# Patient Record
Sex: FEMALE | Race: WHITE | URBAN - METROPOLITAN AREA
[De-identification: names, ages, dates, MRNs, and addresses within clinical notes are randomized per-mention and may not be internally consistent; named-entity substitution may affect disease eponyms.]

---
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
## 2025-06-10 ENCOUNTER — EMERGENCY (EMERGENCY)
Facility: HOSPITAL | Age: 20
LOS: 1 days | End: 2025-06-10
Attending: EMERGENCY MEDICINE
Payer: COMMERCIAL

## 2025-06-10 VITALS
TEMPERATURE: 98 F | HEART RATE: 61 BPM | SYSTOLIC BLOOD PRESSURE: 112 MMHG | OXYGEN SATURATION: 99 % | RESPIRATION RATE: 16 BRPM | DIASTOLIC BLOOD PRESSURE: 69 MMHG

## 2025-06-10 VITALS
RESPIRATION RATE: 19 BRPM | DIASTOLIC BLOOD PRESSURE: 71 MMHG | TEMPERATURE: 98 F | HEART RATE: 77 BPM | SYSTOLIC BLOOD PRESSURE: 104 MMHG | WEIGHT: 149.91 LBS | OXYGEN SATURATION: 96 % | HEIGHT: 65 IN

## 2025-06-10 LAB
ALBUMIN SERPL ELPH-MCNC: 4.6 G/DL — SIGNIFICANT CHANGE UP (ref 3.3–5)
ALP SERPL-CCNC: 77 U/L — SIGNIFICANT CHANGE UP (ref 40–120)
ALT FLD-CCNC: 9 U/L — LOW (ref 10–45)
ANION GAP SERPL CALC-SCNC: 13 MMOL/L — SIGNIFICANT CHANGE UP (ref 5–17)
APPEARANCE UR: CLEAR — SIGNIFICANT CHANGE UP
AST SERPL-CCNC: 15 U/L — SIGNIFICANT CHANGE UP (ref 10–40)
BACTERIA # UR AUTO: ABNORMAL /HPF
BASOPHILS # BLD AUTO: 0.08 K/UL — SIGNIFICANT CHANGE UP (ref 0–0.2)
BASOPHILS NFR BLD AUTO: 0.7 % — SIGNIFICANT CHANGE UP (ref 0–2)
BILIRUB SERPL-MCNC: 0.5 MG/DL — SIGNIFICANT CHANGE UP (ref 0.2–1.2)
BILIRUB UR-MCNC: NEGATIVE — SIGNIFICANT CHANGE UP
BUN SERPL-MCNC: 11 MG/DL — SIGNIFICANT CHANGE UP (ref 7–23)
CALCIUM SERPL-MCNC: 9.4 MG/DL — SIGNIFICANT CHANGE UP (ref 8.4–10.5)
CAST: 0 /LPF — SIGNIFICANT CHANGE UP (ref 0–4)
CHLORIDE SERPL-SCNC: 104 MMOL/L — SIGNIFICANT CHANGE UP (ref 96–108)
CO2 SERPL-SCNC: 22 MMOL/L — SIGNIFICANT CHANGE UP (ref 22–31)
COLOR SPEC: SIGNIFICANT CHANGE UP
CREAT SERPL-MCNC: 0.74 MG/DL — SIGNIFICANT CHANGE UP (ref 0.5–1.3)
DIFF PNL FLD: ABNORMAL
EGFR: 119 ML/MIN/1.73M2 — SIGNIFICANT CHANGE UP
EGFR: 119 ML/MIN/1.73M2 — SIGNIFICANT CHANGE UP
EOSINOPHIL # BLD AUTO: 0.1 K/UL — SIGNIFICANT CHANGE UP (ref 0–0.5)
EOSINOPHIL NFR BLD AUTO: 0.9 % — SIGNIFICANT CHANGE UP (ref 0–6)
GLUCOSE SERPL-MCNC: 92 MG/DL — SIGNIFICANT CHANGE UP (ref 70–99)
GLUCOSE UR QL: NEGATIVE MG/DL — SIGNIFICANT CHANGE UP
HCG SERPL-ACNC: <2 MIU/ML — SIGNIFICANT CHANGE UP
HCT VFR BLD CALC: 43.5 % — SIGNIFICANT CHANGE UP (ref 34.5–45)
HGB BLD-MCNC: 14.7 G/DL — SIGNIFICANT CHANGE UP (ref 11.5–15.5)
IMM GRANULOCYTES NFR BLD AUTO: 0.3 % — SIGNIFICANT CHANGE UP (ref 0–0.9)
KETONES UR QL: NEGATIVE MG/DL — SIGNIFICANT CHANGE UP
LEUKOCYTE ESTERASE UR-ACNC: NEGATIVE — SIGNIFICANT CHANGE UP
LYMPHOCYTES # BLD AUTO: 3.58 K/UL — HIGH (ref 1–3.3)
LYMPHOCYTES # BLD AUTO: 30.7 % — SIGNIFICANT CHANGE UP (ref 13–44)
MCHC RBC-ENTMCNC: 28.9 PG — SIGNIFICANT CHANGE UP (ref 27–34)
MCHC RBC-ENTMCNC: 33.8 G/DL — SIGNIFICANT CHANGE UP (ref 32–36)
MCV RBC AUTO: 85.5 FL — SIGNIFICANT CHANGE UP (ref 80–100)
MONOCYTES # BLD AUTO: 1 K/UL — HIGH (ref 0–0.9)
MONOCYTES NFR BLD AUTO: 8.6 % — SIGNIFICANT CHANGE UP (ref 2–14)
NEUTROPHILS # BLD AUTO: 6.86 K/UL — SIGNIFICANT CHANGE UP (ref 1.8–7.4)
NEUTROPHILS NFR BLD AUTO: 58.8 % — SIGNIFICANT CHANGE UP (ref 43–77)
NITRITE UR-MCNC: NEGATIVE — SIGNIFICANT CHANGE UP
NRBC BLD AUTO-RTO: 0 /100 WBCS — SIGNIFICANT CHANGE UP (ref 0–0)
PH UR: 6.5 — SIGNIFICANT CHANGE UP (ref 5–8)
PLATELET # BLD AUTO: 265 K/UL — SIGNIFICANT CHANGE UP (ref 150–400)
POTASSIUM SERPL-MCNC: 4.2 MMOL/L — SIGNIFICANT CHANGE UP (ref 3.5–5.3)
POTASSIUM SERPL-SCNC: 4.2 MMOL/L — SIGNIFICANT CHANGE UP (ref 3.5–5.3)
PROT SERPL-MCNC: 7.8 G/DL — SIGNIFICANT CHANGE UP (ref 6–8.3)
PROT UR-MCNC: NEGATIVE MG/DL — SIGNIFICANT CHANGE UP
RBC # BLD: 5.09 M/UL — SIGNIFICANT CHANGE UP (ref 3.8–5.2)
RBC # FLD: 12.1 % — SIGNIFICANT CHANGE UP (ref 10.3–14.5)
RBC CASTS # UR COMP ASSIST: 4 /HPF — SIGNIFICANT CHANGE UP (ref 0–4)
SODIUM SERPL-SCNC: 139 MMOL/L — SIGNIFICANT CHANGE UP (ref 135–145)
SP GR SPEC: 1.02 — SIGNIFICANT CHANGE UP (ref 1–1.03)
SQUAMOUS # UR AUTO: 2 /HPF — SIGNIFICANT CHANGE UP (ref 0–5)
UROBILINOGEN FLD QL: 0.2 MG/DL — SIGNIFICANT CHANGE UP (ref 0.2–1)
WBC # BLD: 11.65 K/UL — HIGH (ref 3.8–10.5)
WBC # FLD AUTO: 11.65 K/UL — HIGH (ref 3.8–10.5)
WBC UR QL: 1 /HPF — SIGNIFICANT CHANGE UP (ref 0–5)

## 2025-06-10 PROCEDURE — 85025 COMPLETE CBC W/AUTO DIFF WBC: CPT

## 2025-06-10 PROCEDURE — 36000 PLACE NEEDLE IN VEIN: CPT

## 2025-06-10 PROCEDURE — 80053 COMPREHEN METABOLIC PANEL: CPT

## 2025-06-10 PROCEDURE — 99283 EMERGENCY DEPT VISIT LOW MDM: CPT | Mod: 25

## 2025-06-10 PROCEDURE — 84702 CHORIONIC GONADOTROPIN TEST: CPT

## 2025-06-10 PROCEDURE — 81001 URINALYSIS AUTO W/SCOPE: CPT

## 2025-06-10 PROCEDURE — 99284 EMERGENCY DEPT VISIT MOD MDM: CPT

## 2025-06-10 RX ORDER — SODIUM CHLORIDE 9 G/1000ML
1000 INJECTION, SOLUTION INTRAVENOUS ONCE
Refills: 0 | Status: COMPLETED | OUTPATIENT
Start: 2025-06-10 | End: 2025-06-10

## 2025-06-10 RX ADMIN — SODIUM CHLORIDE 1000 MILLILITER(S): 9 INJECTION, SOLUTION INTRAVENOUS at 17:02

## 2025-06-10 NOTE — ED PROVIDER NOTE - IV ALTEPLASE INCLUSION HIDDEN
Spoke to patient regarding BX results and Dr Barfield recommendation.    ----- Message from Marietta Dallas MD sent at 2/18/2022  4:18 PM CST -----  Hi, please let patient know their biopsy result was benign (non-cancerous), no additional treatment is required       show

## 2025-06-10 NOTE — ED PROVIDER NOTE - NSFOLLOWUPCLINICS_GEN_ALL_ED_FT
[facility]  Gastroenterology & Nutrition  [address], [facility]  [place] [address]  Phone: [phone]  Fax:     [facility]  Gastroenterology  [address] [place] [address] [address]  [place] [address]  Phone: [phone]  Fax:

## 2025-06-10 NOTE — ED PROVIDER NOTE - OBJECTIVE STATEMENT
19-year-old otherwise healthy Female who presents with constipation for the past 2 weeks. Has taken various OTC (sennosides) and teas/juices for this, but found minimal relief. Was able to pass a small bowel movement 2 days ago. Now complains of decreased PO intake associated with abdominal bloating and nausea. Denies fever, chills, chest pain, shortness of breath, vomiting, urinary complaints, vaginal bleeding or discharge.

## 2025-06-10 NOTE — ED PROVIDER NOTE - CLINICAL SUMMARY MEDICAL DECISION MAKING FREE TEXT BOX
19-year-old otherwise healthy Female who presents with constipation for the past 2 weeks. Has taken various OTC (sennosides) and teas/juices for this, but found minimal relief. Was able to pass a small BM 2 days ago. Now complains of decreased PO intake associated with nausea and bloating. No vomiting. On exam, afebrile, no tachycardia, /71, saturating well on RA. Benign abdominal exam. Low suspicion for acute surgical abdomen. Initially evaluated by [facility] where labs and UA obtained.

## 2025-06-10 NOTE — ED ADULT NURSE NOTE - OBJECTIVE STATEMENT
18yo F w/ no significant PMH presents to ED c/o constipation. Pt notes having symptoms for about the last 2 weeks, has been taking OTC medications but has not had much relief. Over the last 2 days has had very small bowel movements and has not felt relief. Pt also notes having associated nausea and decreased PO intake, also feels bloated. LMP "a few days ago". A&Ox3. Strong peripheral pulses. Abdomen soft, distended according to patient, nontender to palpation. Skin warm dry intact and normal for ethnicity. Ambulatory with steady gait in ED. Stretcher locked and in lowest position, appropriate side rails up. Pt instructed to notify RN if assistance is needed.

## 2025-06-10 NOTE — ED PROVIDER NOTE - ATTENDING CONTRIBUTION TO CARE
Attending MD Johnson:  I have seen and examined this patient and fully participated in the care of this patient as the teaching attending. I personally made/approved the management plan and take responsibility for the patient management.       Patient presents to the emergency department with a two-week history of difficulty digesting food and decreased appetite. Today, she experienced an episode of vomiting after attempting to eat applesauce. She reports experiencing nausea over the past couple of days. Patient describes intermittent cramping abdominal pain located in the mid-epigastric region. She denies fevers, chills, or dysuria. Patient reports constipation with her last bowel movement occurring on Saturday, which was described as small in volume. Prior to that, she had not had a bowel movement for approximately one week. She has been taking over-the-counter laxatives regularly, specifically an "overnight" formulation from [employer], which she took nightly for nearly a week without effect. She also tried herbal tea which provided minimal relief. Patient took Lactaid for her symptoms. She denies any prior similar episodes and has no history of gastrointestinal disorders. No prior consultations with gastroenterology specialists.    Past Medical History (may not be comprehensive): None reported    Past Surgical History (may not be comprehensive): Denies any abdominal surgeries    Allergies: No known medication allergies    Medication History (may not be comprehensive): Has been taking over-the-counter laxatives nightly for nearly a week, herbal tea, and Lactaid    Social History: Not reported    Review of Systems:  - Constitutional symptoms: Denies fever or chills  - Gastrointestinal: Reports difficulty digesting food, decreased appetite, nausea, vomiting once today, constipation, and mid-epigastric cramping abdominal pain  - Genitourinary: Denies dysuria  - Musculoskeletal: Denies back pain      Patient's vital signs are within normal limits.  She is sitting in the stretcher no apparent acute distress.  Breathing comfortably in room air.  The abdomen is soft nontender nondistended.  Extremities warm well-perfused.  Affect normal.    Patient is presenting for evaluation of 2 weeks of constipation and now a couple days of nausea and 1 day of vomiting.  Patient's examination reveals nontender abdomen normal vital signs.  Do not suspect acute surgical intra-abdominal pathology given benign abdominal examination.  Patient underwent screening lab work from triage which showed normal LFT panel, negative pregnancy test.  At this time I do not think patient warrants urgent abdominal cross-sectional imaging.  Will provide referral for GI consultation as outpatient, continue bowel regiment, hydration and expectant management      *The above represents an initial assessment/impression. Please refer to progress notes for potential changes in patient clinical course*

## 2025-06-10 NOTE — ED PROVIDER NOTE - PATIENT PORTAL LINK FT
You can access the FollowMyHealth Patient Portal offered by [facility] by registering at the following website: [email or web address]. By joining [facility]’s FollowMyHealth portal, you will also be able to view your health information using other applications (apps) compatible with our system.

## 2025-06-10 NOTE — ED PROVIDER NOTE - PROGRESS NOTE DETAILS
Labs showed mild leukocytosis at 11.65. No severe anemia. No evidence of severe electrolyte abnormalities, liver disease, or renal disorder. Negative pregnancy. UA showed no acute infection. On reassessment, the patient is feeling much better after IVF.   Results discussed with patient and their family. Provided a list of different bowel regimen medications to aid for constipation. Instructed the importance of following up with their PMD. Referral given to GI. Strict return precautions given. The patient expressed understanding and feels comfortable being discharged home.

## 2025-06-10 NOTE — ED ADULT TRIAGE NOTE - CHIEF COMPLAINT QUOTE
Pt c/o decreased PO intake x 1.5 wks. endorses constipation, last BM on Sat. Pt endorses taking laxatives, medications w/o relief. Pt endorses intermitted abd pain, denies fever, chills, vomiting.

## 2025-06-10 NOTE — ED PROVIDER NOTE - PHYSICAL EXAMINATION
INITIAL VITAL SIGNS: Reviewed by me.  GENERAL: In no acute distress.  HEAD: Normocephalic. Atraumatic.  EYES: EOMI. PERRL.  ENT: Moist mucous membranes.   NECK: Supple. No meningismus.   CV: Regular rate and rhythm. No murmurs, rubs, or gallops.  RESPIRATORY: Unlabored respirations. Clear to ascultation bilaterally.  ABDOMEN: Soft, non-distended, non-tender. No guarding or rebound.  BACK: No CVA tenderness.  EXTREMITIES: No deformities. No edema.   SKIN: Warm and dry. No rashes or petechiae.  NEURO: Grossly non-focal.

## 2025-06-10 NOTE — ED PROVIDER NOTE - RAPID ASSESSMENT
19-year-old female no past medical history now presenting to the ED with constipation 2 weeks.  Patient has had small BMs feels bloated lack of appetite and nauseous.  Patient has tried multiple over-the-counter laxatives to have a BM but unsuccessful.  Patient denied fever urinary symptoms vomiting.  LMP few days ago    ABDULLAHI Still: Patient seen by myself in triage area for rapid assessment only. Full H&P to be performed in main emergency room by ER providers.

## 2025-06-10 NOTE — ED PROVIDER NOTE - NSFOLLOWUPINSTRUCTIONS_ED_ALL_ED_FT
You were seen in the ED for constipation.  Your test results did not reveal any concerning abnormalities.      Follow up with your doctor this week.   You were given a referral to GI. Please call the number below to make an appointment.     You can take the following for constipation:  Miralax: 17 grams twice a day, and can increase to three times a day if no relief   Senna: Two tabs 1-2 times per day  Docusate (Colace): 100mg 1-2 times per day   Bisacodyl (Dulcolax): 10mg suppository up to three times a day  Magnesium citrate: 100-240 mL QD-BID  You may also use an enema but no more than 2 doses in 24 hours.    Please continue to take any home medications as prescribed  Your test results from your ED visit were discussed with you prior to discharge  You were provided with a copy of your test results    Constipation  Constipation is when a person has fewer than three bowel movements a week, has difficulty having a bowel movement, or has stools that are dry, hard, or larger than normal. Other symptoms can include abdominal pain or bloating. As people grow older, constipation is more common. A low-fiber diet, not taking in enough fluids, and taking certain medicines, including opioid painkillers, may make constipation worse. Treatment varies but may include dietary modifications (more fiber-rich foods), lifestyle modifications, and possible medications.     SEEK IMMEDIATE MEDICAL CARE IF YOU HAVE ANY OF THE FOLLOWING SYMPTOMS: bright red blood in your stool, constipation for longer than 4 days, abdominal or rectal pain, unexplained weight loss, or inability to pass gas.